# Patient Record
Sex: FEMALE | Race: WHITE | NOT HISPANIC OR LATINO | Employment: FULL TIME | URBAN - METROPOLITAN AREA
[De-identification: names, ages, dates, MRNs, and addresses within clinical notes are randomized per-mention and may not be internally consistent; named-entity substitution may affect disease eponyms.]

---

## 2021-08-12 ENCOUNTER — VBI (OUTPATIENT)
Dept: ADMINISTRATIVE | Facility: OTHER | Age: 51
End: 2021-08-12

## 2021-08-12 NOTE — TELEPHONE ENCOUNTER
Upon review of the In Basket request we were able to locate, review, and update the patient chart as requested for Mammogram and Pap Smear (HPV) aka Cervical Cancer Screening  Any additional questions or concerns should be emailed to the Practice Liaisons via [email or web address] email, please do not reply via In Basket      Thank you  Amber Reyes

## 2021-08-17 ENCOUNTER — OFFICE VISIT (OUTPATIENT)
Dept: OBGYN CLINIC | Facility: CLINIC | Age: 51
End: 2021-08-17
Payer: COMMERCIAL

## 2021-08-17 VITALS
DIASTOLIC BLOOD PRESSURE: 70 MMHG | HEIGHT: 63 IN | BODY MASS INDEX: 22.15 KG/M2 | WEIGHT: 125 LBS | SYSTOLIC BLOOD PRESSURE: 110 MMHG

## 2021-08-17 DIAGNOSIS — M79.18 MYOFASCIAL PAIN SYNDROME: ICD-10-CM

## 2021-08-17 DIAGNOSIS — Z85.41 HISTORY OF CERVICAL CANCER: ICD-10-CM

## 2021-08-17 DIAGNOSIS — R14.0 BLOATING: Primary | ICD-10-CM

## 2021-08-17 DIAGNOSIS — L90.0 LICHEN SCLEROSUS: ICD-10-CM

## 2021-08-17 PROCEDURE — 99213 OFFICE O/P EST LOW 20 MIN: CPT | Performed by: OBSTETRICS & GYNECOLOGY

## 2021-08-17 RX ORDER — FLUTICASONE PROPIONATE 50 MCG
1 SPRAY, SUSPENSION (ML) NASAL DAILY
COMMUNITY

## 2021-08-17 RX ORDER — DIPHENOXYLATE HYDROCHLORIDE AND ATROPINE SULFATE 2.5; .025 MG/1; MG/1
1 TABLET ORAL DAILY
COMMUNITY

## 2021-08-17 RX ORDER — CETIRIZINE HYDROCHLORIDE 10 MG/1
10 TABLET ORAL DAILY
COMMUNITY

## 2021-08-17 NOTE — PROGRESS NOTES
PROBLEM GYNECOLOGICAL VISIT    Eben Cavazos is a 46 y o  female who presents today with complaint of increase in  pelvic pressure   + hx of  Adenocarcinoma of the  Cervix, s/p radical hyst in  1914 ,  Lichen sclerosus et atrophicus  Not sure of that  Dx  No itching  An d myofacial pain syndrome  Pt with recent increase in  Pelvic  Discomfort  Bowels and  Bladder wnl    + worried about ovaries  Not sexually active  Voids every 2-3 hours  +  Increase in pressure at the end of the day  Some dryness     Her general medical history has been reviewed and she reports it as follows:    Past Medical History:   Diagnosis Date    Adenocarcinoma of cervix ([address] )     1A    Lichen sclerosus et atrophicus     Myofascial pain syndrome     Papanicolaou smear 10/25/2019     Past Surgical History:   Procedure Laterality Date    CERVICAL CONE BIOPSY       SECTION   &     HYSTEROSCOPY      [facility]     RADICAL HYSTERECTOMY  2008     OB History        2    Para   2    Term                AB        Living           SAB        TAB        Ectopic        Multiple        Live Births                   Social History     Tobacco Use    Smoking status: Never Smoker    Smokeless tobacco: Never Used   Substance Use Topics    Alcohol use: Yes     Comment: Special occasions (1 or less/month)     Drug use: Never     Comment: No - As per [employer]        Current Outpatient Medications   Medication Instructions    cetirizine (ZYRTEC) 10 mg, Oral, Daily    fluticasone (FLONASE) 50 mcg/act nasal spray 1 spray, Nasal, Daily    Lactobacillus (PROBIOTIC ACIDOPHILUS PO) Oral    multivitamin (THERAGRAN) TABS 1 tablet, Oral, Daily    TURMERIC PO Oral       History of Present Illness:   See CC      Review of Systems:  Review of Systems   Constitutional: Negative  Gastrointestinal: Negative  Genitourinary: Positive for pelvic pain  Negative for decreased urine volume, difficulty urinating, dysuria, enuresis, flank pain, frequency, genital sores, hematuria, urgency, vaginal bleeding, vaginal discharge and vaginal pain  Musculoskeletal: Positive for myalgias  Negative for arthralgias  Skin: Negative  Neurological: Negative  Psychiatric/Behavioral: Negative  All other systems reviewed and are negative  Physical Exam:  /70   Ht 5' 3" (1 6 m)   Wt 56 7 kg (125 lb)   Breastfeeding No   BMI 22 14 kg/m²   Physical Exam  Constitutional:       Appearance: Normal appearance  She is normal weight  Genitourinary:      Pelvic exam was performed with patient in the lithotomy position  Vulva, inguinal canal, urethra, bladder, right adnexa, left adnexa and rectum normal       No posterior fourchette tenderness, injury, rash or lesion present  No lesions in the vagina  Vaginal atrophy and atrophic mucosa present  No vaginal discharge, erythema, tenderness or bleeding  No foreign body in the vagina  Cervix is absent  Uterus is absent  Right adnexa not tender or full  Left adnexa not tender or full  Genitourinary Comments: Noted on  Exam  w valsalva  2 degree  descensus of the anterior  Vaginal wall, cuff intact    Abdominal:      General: Abdomen is flat  Bowel sounds are normal  There is no distension  Tenderness: There is no abdominal tenderness  There is no right CVA tenderness, left CVA tenderness, guarding or rebound  Neurological:      Mental Status: She is alert and oriented to person, place, and time  Skin:     General: Skin is warm  Psychiatric:         Mood and Affect: Mood normal    Vitals and nursing note reviewed  Assessment:   1  Bloating, pelvic  Pressure, hx of cervical  Adenocarcinoma, myofacial pain syndrome    Plan:   + has  currently and working on  Muscles    + dw pt  Core work and  Pelvic  Floor, instructed on  Kasandra,  To get  tv us to assess the ovaries  Due to bloating  Option given for  Pelvic floor  PT   Vs pessary  Fu after US in      Reviewed with patient that test results are available in [place] immediately, but that they will not necessarily be reviewed by me immediately  Explained that I will review results at my earliest opportunity and contact patient appropriately

## 2022-07-13 PROBLEM — R92.30 DENSE BREAST TISSUE ON MAMMOGRAM: Status: ACTIVE | Noted: 2022-07-13

## 2022-07-13 PROBLEM — R92.2 DENSE BREAST TISSUE ON MAMMOGRAM: Status: ACTIVE | Noted: 2022-07-13

## 2022-07-13 PROBLEM — L90.0 LICHEN SCLEROSUS: Status: ACTIVE | Noted: 2022-07-13

## 2022-07-13 PROBLEM — M79.18 MYOFASCIAL PAIN: Status: ACTIVE | Noted: 2022-07-13

## 2022-07-13 NOTE — PROGRESS NOTES
Assessment/Plan:    Encounter for gynecological examination (general) (routine) without abnormal findings  All well, no complaints  Normal breast and pelvic exams  Pap repeated, normal last year  Mammo order given  Colonoscopy , due   Dexa @65, no risk factors       Diagnoses and all orders for this visit:    Encounter for gynecological examination (general) (routine) without abnormal findings    Breast cancer screening by mammogram  -     Mammo screening bilateral w 3d & cad; Future    Personal history of adenocarcinoma of cervix  -     IGP, rfx Aptima HPV ASCU    Screening for malignant neoplasm of the cervix  -     IGP, rfx Aptima HPV ASCU    Other orders  -     Collagen Hydrolysate POWD; Use          Subjective:      Patient ID: Tyler Ayers is a 45 y o  female  Here for well check  The following portions of the patient's history were reviewed and updated as appropriate:   She  has a past medical history of Adenocarcinoma of cervix ([facility] [address] ), Lichen sclerosus et atrophicus, Myofascial pain syndrome, and Papanicolaou smear (10/25/2019)  She   Patient Active Problem List    Diagnosis Date Noted    Encounter for gynecological examination (general) (routine) without abnormal findings 07/15/2022    Dense breast tissue on mammogram     Lichen sclerosus     Myofascial pain 2022    Personal history of adenocarcinoma of cervix     History of radical hysterectomy      She  has a past surgical history that includes  section ( & ); Radical hysterectomy (2008); Cervical cone biopsy (); Hysteroscopy (); Mammo (historical) (Bilateral, 2021); and Colonoscopy ()    Her family history includes Asthma in her mother and paternal grandmother; Corneal ulcer in her father; Heart attack in her mother and paternal grandfather; Hyperlipidemia in her father and mother; Hypertension in her father and mother; Kidney failure in her maternal grandfather; Multiple sclerosis in her maternal grandmother; No Known Problems in her sister, sister, and son; Scoliosis in her sister; Stroke in her brother, father, and mother  She  reports that she has never smoked  She has never used smokeless tobacco  She reports current alcohol use  She reports that she does not use drugs  Current Outpatient Medications   Medication Sig Dispense Refill    cetirizine (ZyrTEC) 10 mg tablet Take 10 mg by mouth daily      Collagen Hydrolysate POWD Use      fluticasone (FLONASE) 50 mcg/act nasal spray 1 spray into each nostril daily      Lactobacillus (PROBIOTIC ACIDOPHILUS PO) Take by mouth      multivitamin (THERAGRAN) TABS Take 1 tablet by mouth daily      TURMERIC PO Take by mouth       No current facility-administered medications for this visit  She is allergic to ceftin [cefuroxime] and penicillins       Review of Systems  No breast, bladder, bowel changes   No new persistent pain, bloating, early satiety or pelvic pressure      Objective:      /70 (BP Location: Left arm, Patient Position: Sitting, Cuff Size: Standard)   Ht 5' 2 75" (1 594 m)   Wt 59 8 kg (131 lb 12 8 oz)   Breastfeeding No   BMI 23 53 kg/m²          Physical Exam    General appearance: no distress, pleasant  Neck: thyroid without nodules or thyromegaly, no palpable adenopathy  Lymph nodes: no palpable adenopathy  Breasts: no masses, nodes or skin changes  Abdomen: soft, non tender, no palpable masses  Pelvic exam: normal atrophic external genitalia, urethral meatus normal, vagina atrophic without lesions, cuff intact, no adnexal masses, non tender  Rectal exam: normal sphincter tone, no masses, RV confirms above

## 2022-07-15 ENCOUNTER — ANNUAL EXAM (OUTPATIENT)
Dept: OBGYN CLINIC | Facility: CLINIC | Age: 52
End: 2022-07-15
Payer: COMMERCIAL

## 2022-07-15 VITALS
SYSTOLIC BLOOD PRESSURE: 128 MMHG | DIASTOLIC BLOOD PRESSURE: 70 MMHG | WEIGHT: 131.8 LBS | HEIGHT: 63 IN | BODY MASS INDEX: 23.35 KG/M2

## 2022-07-15 DIAGNOSIS — Z01.419 ENCOUNTER FOR GYNECOLOGICAL EXAMINATION (GENERAL) (ROUTINE) WITHOUT ABNORMAL FINDINGS: Primary | ICD-10-CM

## 2022-07-15 DIAGNOSIS — Z12.31 BREAST CANCER SCREENING BY MAMMOGRAM: ICD-10-CM

## 2022-07-15 DIAGNOSIS — Z85.41 PERSONAL HISTORY OF MALIGNANT NEOPLASM OF CERVIX UTERI: ICD-10-CM

## 2022-07-15 DIAGNOSIS — Z12.4 SCREENING FOR MALIGNANT NEOPLASM OF THE CERVIX: ICD-10-CM

## 2022-07-15 PROCEDURE — S0612 ANNUAL GYNECOLOGICAL EXAMINA: HCPCS | Performed by: OBSTETRICS & GYNECOLOGY

## 2022-07-15 RX ORDER — COLLAGEN, HYDROLYSATE (BOVINE) 100 %
POWDER (GRAM) MISCELLANEOUS
COMMUNITY

## 2022-07-15 NOTE — ASSESSMENT & PLAN NOTE
All well, no complaints  Normal breast and pelvic exams    Pap repeated, normal last year  Mammo order given  Colonoscopy 2020, due 2030  Dexa @65, no risk factors

## 2022-07-15 NOTE — PATIENT INSTRUCTIONS
Return to office in one year unless having any problems such as breast changes, bleeding, new persistent pain, new progressive bloating, new problems eating (getting full to quickly) or new constant urinary pressure that does not resolve in one week  Continue to strive for total calcium intake of 1200 mg and vitamin D of 1000 IU in combined dietary and supplement forms  You can only absorb 600 mg of calcium at a time  Avoid excess calcium as this may adversely effect the arteries in the heart 
Detail Level: Simple

## 2022-07-15 NOTE — LETTER
July 15, 2022     [address] [place], [place] [address]    Patient: Bina Baron   YOB: 1970   Date of Visit: 7/15/2022       Dear Dr Danyelle Liao: Thank you for referring Bina Baron to me for evaluation  Below are my notes for this consultation  If you have questions, please do not hesitate to call me  I look forward to following your patient along with you  Sincerely,        Lazaro Naranjo MD        CC: No Recipients  Lazaro Naranjo MD  7/15/2022  1:29 PM  Sign when Signing Visit  Assessment/Plan:    Encounter for gynecological examination (general) (routine) without abnormal findings  All well, no complaints  Normal breast and pelvic exams  Pap repeated, normal last year  Mammo order given  Colonoscopy 2020, due 2030  Dexa @65, no risk factors       Diagnoses and all orders for this visit:    Encounter for gynecological examination (general) (routine) without abnormal findings    Breast cancer screening by mammogram  -     Mammo screening bilateral w 3d & cad; Future    Personal history of adenocarcinoma of cervix  -     IGP, rfx Aptima HPV ASCU    Screening for malignant neoplasm of the cervix  -     IGP, rfx Aptima HPV ASCU    Other orders  -     Collagen Hydrolysate POWD; Use          Subjective:      Patient ID: Bina Baron is a 45 y o  female  Here for well check  The following portions of the patient's history were reviewed and updated as appropriate:   She  has a past medical history of Adenocarcinoma of cervix ([address] ), Lichen sclerosus et atrophicus, Myofascial pain syndrome, and Papanicolaou smear (10/25/2019)    She   Patient Active Problem List    Diagnosis Date Noted    Encounter for gynecological examination (general) (routine) without abnormal findings 07/15/2022    Dense breast tissue on mammogram 04/72/0901    Lichen sclerosus 30/32/9197    Myofascial pain 07/13/2022    Personal history of adenocarcinoma of cervix 2008    History of radical hysterectomy      She  has a past surgical history that includes  section ( & ); Radical hysterectomy (2008); Cervical cone biopsy (); Hysteroscopy (); Mammo (historical) (Bilateral, 2021); and Colonoscopy ()  Her family history includes Asthma in her mother and paternal grandmother; Corneal ulcer in her father; Heart attack in her mother and paternal grandfather; Hyperlipidemia in her father and mother; Hypertension in her father and mother; Kidney failure in her maternal grandfather; Multiple sclerosis in her maternal grandmother; No Known Problems in her sister, sister, and son; Scoliosis in her sister; Stroke in her brother, father, and mother  She  reports that she has never smoked  She has never used smokeless tobacco  She reports current alcohol use  She reports that she does not use drugs  Current Outpatient Medications   Medication Sig Dispense Refill    cetirizine (ZyrTEC) 10 mg tablet Take 10 mg by mouth daily      Collagen Hydrolysate POWD Use      fluticasone (FLONASE) 50 mcg/act nasal spray 1 spray into each nostril daily      Lactobacillus (PROBIOTIC ACIDOPHILUS PO) Take by mouth      multivitamin (THERAGRAN) TABS Take 1 tablet by mouth daily      TURMERIC PO Take by mouth       No current facility-administered medications for this visit  She is allergic to ceftin [cefuroxime] and penicillins       Review of Systems  No breast, bladder, bowel changes   No new persistent pain, bloating, early satiety or pelvic pressure      Objective:      /70 (BP Location: Left arm, Patient Position: Sitting, Cuff Size: Standard)   Ht 5' 2 75" (1 594 m)   Wt 59 8 kg (131 lb 12 8 oz)   Breastfeeding No   BMI 23 53 kg/m²          Physical Exam    General appearance: no distress, pleasant  Neck: thyroid without nodules or thyromegaly, no palpable adenopathy  Lymph nodes: no palpable adenopathy  Breasts: no masses, nodes or skin changes  Abdomen: soft, non tender, no palpable masses  Pelvic exam: normal atrophic external genitalia, urethral meatus normal, vagina atrophic without lesions, cuff intact, no adnexal masses, non tender  Rectal exam: normal sphincter tone, no masses, RV confirms above

## 2022-07-20 LAB
CYTOLOGIST CVX/VAG CYTO: NORMAL
DX ICD CODE: NORMAL
OTHER STN SPEC: NORMAL
OTHER STN SPEC: NORMAL
PATH REPORT.FINAL DX SPEC: NORMAL
SL AMB NOTE:: NORMAL
SL AMB SPECIMEN ADEQUACY: NORMAL
SL AMB TEST METHODOLOGY: NORMAL

## 2022-07-21 ENCOUNTER — TELEPHONE (OUTPATIENT)
Dept: OBGYN CLINIC | Facility: CLINIC | Age: 52
End: 2022-07-21

## 2022-07-21 NOTE — TELEPHONE ENCOUNTER
Please encourage to sign up for MyChart and inform of normal pap (h/o cervical cancer in past)    Thanks

## 2022-07-21 NOTE — TELEPHONE ENCOUNTER
Left a message for pt informing pap was normal, if any further questions, please contact the office  Recommended to sign up for My chart to help facilitate receiving results and  messages from provider

## 2023-10-12 DIAGNOSIS — Z12.31 BREAST CANCER SCREENING BY MAMMOGRAM: Primary | ICD-10-CM

## 2023-10-12 NOTE — PROGRESS NOTES
Kiara Patel requesting Mammogram order. Informed Kiara Patel will mail Mammogram order to her home address.

## 2023-12-02 NOTE — PROGRESS NOTES
Assessment/Plan:    Encounter for gynecological examination (general) (routine) without abnormal findings  Here for well check, taking Lexapro now with some benefit to anxiety. Follows with PCP. No breast or gyn complaints. Normal breast and pelvic exams, s/p hysterectomy. Last pap 7/15/22, repeated today due to personal h/o cervical cancer. Mammo and u/s orders given, last 2021 with u/s  Colonoscopy 2020, due 2030  Dexa @65, no risk factors, calcium recs reviewed       Diagnoses and all orders for this visit:    Encounter for gynecological examination (general) (routine) without abnormal findings    History of radical hysterectomy    Personal history of adenocarcinoma of cervix  -     IGP, Aptima HPV, Rfx 16/18,45    Encounter for screening mammogram for malignant neoplasm of breast  -     Mammo screening bilateral w 3d & cad; Future    Screening for malignant neoplasm of the cervix  -     IGP, Aptima HPV, Rfx 16/18,45    Extremely dense tissue of both breasts on mammography  -     US breast screening bilateral complete (ABUS); Future    Other orders  -     escitalopram (LEXAPRO) 5 mg tablet; 5 MG ORALLY DAILY TAKE 1 TAB DAILY AND INCREASE TO 2 TABS DAILY IF NEEDED          Subjective:      Patient ID: Chip Galeana is a 52 y.o. female. HPI Here for well check. The following portions of the patient's history were reviewed and updated as appropriate: She  has a past medical history of Adenocarcinoma of cervix ([address]), Lichen sclerosus et atrophicus, Myofascial pain syndrome, and Papanicolaou smear (10/25/2019).   She   Patient Active Problem List    Diagnosis Date Noted    Encounter for gynecological examination (general) (routine) without abnormal findings 07/15/2022    Dense breast tissue on mammogram 93/97/1628    Lichen sclerosus 20/01/3393    Myofascial pain 07/13/2022    Personal history of adenocarcinoma of cervix 2008    History of radical hysterectomy 2008     She  has a past surgical history that includes  section ( & ); Radical hysterectomy (2008); Cervical cone biopsy (); Hysteroscopy (); Mammo (historical) (Bilateral, 2021); and Colonoscopy (). Her family history includes Asthma in her mother and paternal grandmother; Corneal ulcer in her father; Heart attack in her mother and paternal grandfather; Hyperlipidemia in her father and mother; Hypertension in her father and mother; Kidney failure in her maternal grandfather; Multiple sclerosis in her maternal grandmother; No Known Problems in her sister, sister, and son; Scoliosis in her sister; Stroke in her brother, father, and mother. She  reports that she has never smoked. She has never used smokeless tobacco. She reports current alcohol use. She reports that she does not use drugs. Current Outpatient Medications   Medication Sig Dispense Refill    cetirizine (ZyrTEC) 10 mg tablet Take 10 mg by mouth daily      Collagen Hydrolysate POWD Use      escitalopram (LEXAPRO) 5 mg tablet 5 MG ORALLY DAILY TAKE 1 TAB DAILY AND INCREASE TO 2 TABS DAILY IF NEEDED      fluticasone (FLONASE) 50 mcg/act nasal spray 1 spray into each nostril daily      Lactobacillus (PROBIOTIC ACIDOPHILUS PO) Take by mouth      multivitamin (THERAGRAN) TABS Take 1 tablet by mouth daily      TURMERIC PO Take by mouth       No current facility-administered medications for this visit. She is allergic to ceftin [cefuroxime] and penicillins. .    Review of Systems  No PMB, breast, bladder, bowel changes.  No new persistent pain, bloating, early satiety or pelvic pressure      Objective:      /68 (BP Location: Left arm, Patient Position: Sitting, Cuff Size: Standard)   Ht 5' 4" (1.626 m)   Wt 58.5 kg (129 lb)   Breastfeeding No   BMI 22.14 kg/m²          Physical Exam    General appearance: no distress, pleasant  Neck: thyroid without nodules or thyromegaly, no palpable adenopathy  Lymph nodes: no palpable adenopathy  Breasts: no masses, nodes or skin changes  Abdomen: soft, non tender, no palpable masses  Pelvic exam: normal atrophic external genitalia, urethral meatus normal, vagina atrophic without lesions, cuff intact, no adnexal masses, non tender  Rectal exam: normal sphincter tone, no masses, RV confirms above

## 2023-12-04 ENCOUNTER — ANNUAL EXAM (OUTPATIENT)
Dept: OBGYN CLINIC | Facility: CLINIC | Age: 53
End: 2023-12-04
Payer: COMMERCIAL

## 2023-12-04 VITALS
DIASTOLIC BLOOD PRESSURE: 68 MMHG | HEIGHT: 64 IN | SYSTOLIC BLOOD PRESSURE: 116 MMHG | WEIGHT: 129 LBS | BODY MASS INDEX: 22.02 KG/M2

## 2023-12-04 DIAGNOSIS — Z12.4 SCREENING FOR MALIGNANT NEOPLASM OF THE CERVIX: ICD-10-CM

## 2023-12-04 DIAGNOSIS — Z85.41 PERSONAL HISTORY OF MALIGNANT NEOPLASM OF CERVIX UTERI: ICD-10-CM

## 2023-12-04 DIAGNOSIS — Z01.419 ENCOUNTER FOR GYNECOLOGICAL EXAMINATION (GENERAL) (ROUTINE) WITHOUT ABNORMAL FINDINGS: Primary | ICD-10-CM

## 2023-12-04 DIAGNOSIS — Z90.710 HISTORY OF HYSTERECTOMY: ICD-10-CM

## 2023-12-04 DIAGNOSIS — Z12.31 ENCOUNTER FOR SCREENING MAMMOGRAM FOR MALIGNANT NEOPLASM OF BREAST: ICD-10-CM

## 2023-12-04 DIAGNOSIS — R92.343 EXTREMELY DENSE TISSUE OF BOTH BREASTS ON MAMMOGRAPHY: ICD-10-CM

## 2023-12-04 PROCEDURE — S0612 ANNUAL GYNECOLOGICAL EXAMINA: HCPCS | Performed by: OBSTETRICS & GYNECOLOGY

## 2023-12-04 RX ORDER — ESCITALOPRAM OXALATE 5 MG/1
TABLET ORAL
COMMUNITY
Start: 2023-10-12

## 2023-12-04 NOTE — ASSESSMENT & PLAN NOTE
Here for well check, taking Lexapro now with some benefit to anxiety. Follows with PCP. No breast or gyn complaints. Normal breast and pelvic exams, s/p hysterectomy. Last pap 7/15/22, repeated today due to personal h/o cervical cancer.   Mammo and u/s orders given, last 2021 with u/s  Colonoscopy 2020, due 2030  Dexa @65, no risk factors, calcium recs reviewed

## 2023-12-04 NOTE — LETTER
December 4, 2023     DO Taye Salazar  [address]    Patient: Corwin Jung   YOB: 1970   Date of Visit: 12/4/2023       Dear Dr. Carolyn Millerr:    Corwin Jung was in today for her annual gyn exam. Below are my notes for this consultation. If you have questions, please do not hesitate to call me. I look forward to following your patient along with you. Sincerely,        Geena Avalos MD        CC: No Recipients    Geena Avalos MD  12/4/2023  2:22 PM  Sign when Signing Visit  Assessment/Plan:    Encounter for gynecological examination (general) (routine) without abnormal findings  Here for well check, taking Lexapro now with some benefit to anxiety. Follows with PCP. No breast or gyn complaints. Normal breast and pelvic exams, s/p hysterectomy. Last pap 7/15/22, repeated today due to personal h/o cervical cancer. Mammo and u/s orders given, last 2021 with u/s  Colonoscopy 2020, due 2030  Dexa @65, no risk factors, calcium recs reviewed       Diagnoses and all orders for this visit:    Encounter for gynecological examination (general) (routine) without abnormal findings    History of radical hysterectomy    Personal history of adenocarcinoma of cervix  -     IGP, Aptima HPV, Rfx 16/18,45    Encounter for screening mammogram for malignant neoplasm of breast  -     Mammo screening bilateral w 3d & cad; Future    Screening for malignant neoplasm of the cervix  -     IGP, Aptima HPV, Rfx 16/18,45    Extremely dense tissue of both breasts on mammography  -     US breast screening bilateral complete (ABUS); Future    Other orders  -     escitalopram (LEXAPRO) 5 mg tablet; 5 MG ORALLY DAILY TAKE 1 TAB DAILY AND INCREASE TO 2 TABS DAILY IF NEEDED          Subjective:      Patient ID: Corwin Jung is a 52 y.o. female. HPI Here for well check.     The following portions of the patient's history were reviewed and updated as appropriate: She  has a past medical history of Adenocarcinoma of cervix ([address]), Lichen sclerosus et atrophicus, Myofascial pain syndrome, and Papanicolaou smear (10/25/2019). She   Patient Active Problem List    Diagnosis Date Noted   • Encounter for gynecological examination (general) (routine) without abnormal findings 07/15/2022   • Dense breast tissue on mammogram    • Lichen sclerosus    • Myofascial pain 2022   • Personal history of adenocarcinoma of cervix    • History of radical hysterectomy      She  has a past surgical history that includes  section ( & ); Radical hysterectomy (2008); Cervical cone biopsy (); Hysteroscopy (); Mammo (historical) (Bilateral, 2021); and Colonoscopy (). Her family history includes Asthma in her mother and paternal grandmother; Corneal ulcer in her father; Heart attack in her mother and paternal grandfather; Hyperlipidemia in her father and mother; Hypertension in her father and mother; Kidney failure in her maternal grandfather; Multiple sclerosis in her maternal grandmother; No Known Problems in her sister, sister, and son; Scoliosis in her sister; Stroke in her brother, father, and mother. She  reports that she has never smoked. She has never used smokeless tobacco. She reports current alcohol use. She reports that she does not use drugs. Current Outpatient Medications   Medication Sig Dispense Refill   • cetirizine (ZyrTEC) 10 mg tablet Take 10 mg by mouth daily     • Collagen Hydrolysate POWD Use     • escitalopram (LEXAPRO) 5 mg tablet 5 MG ORALLY DAILY TAKE 1 TAB DAILY AND INCREASE TO 2 TABS DAILY IF NEEDED     • fluticasone (FLONASE) 50 mcg/act nasal spray 1 spray into each nostril daily     • Lactobacillus (PROBIOTIC ACIDOPHILUS PO) Take by mouth     • multivitamin (THERAGRAN) TABS Take 1 tablet by mouth daily     • TURMERIC PO Take by mouth       No current facility-administered medications for this visit.      She is allergic to ceftin [cefuroxime] and penicillins. .    Review of Systems  No PMB, breast, bladder, bowel changes.  No new persistent pain, bloating, early satiety or pelvic pressure      Objective:      /68 (BP Location: Left arm, Patient Position: Sitting, Cuff Size: Standard)   Ht 5' 4" (1.626 m)   Wt 58.5 kg (129 lb)   Breastfeeding No   BMI 22.14 kg/m²          Physical Exam    General appearance: no distress, pleasant  Neck: thyroid without nodules or thyromegaly, no palpable adenopathy  Lymph nodes: no palpable adenopathy  Breasts: no masses, nodes or skin changes  Abdomen: soft, non tender, no palpable masses  Pelvic exam: normal atrophic external genitalia, urethral meatus normal, vagina atrophic without lesions, cuff intact, no adnexal masses, non tender  Rectal exam: normal sphincter tone, no masses, RV confirms above

## 2023-12-08 LAB
CYTOLOGIST CVX/VAG CYTO: NORMAL
DX ICD CODE: NORMAL
HPV GENOTYPE REFLEX: NORMAL
HPV I/H RISK 4 DNA CVX QL PROBE+SIG AMP: NEGATIVE
OTHER STN SPEC: NORMAL
PATH REPORT.FINAL DX SPEC: NORMAL
SL AMB NOTE:: NORMAL
SL AMB SPECIMEN ADEQUACY: NORMAL
SL AMB TEST METHODOLOGY: NORMAL

## 2024-02-21 PROBLEM — Z01.419 ENCOUNTER FOR GYNECOLOGICAL EXAMINATION (GENERAL) (ROUTINE) WITHOUT ABNORMAL FINDINGS: Status: RESOLVED | Noted: 2022-07-15 | Resolved: 2024-02-21

## 2024-12-27 ENCOUNTER — TELEPHONE (OUTPATIENT)
Age: 54
End: 2024-12-27

## 2024-12-27 DIAGNOSIS — R92.343 EXTREMELY DENSE TISSUE OF BOTH BREASTS ON MAMMOGRAPHY: Primary | ICD-10-CM

## 2024-12-27 DIAGNOSIS — Z12.31 BREAST CANCER SCREENING BY MAMMOGRAM: ICD-10-CM

## 2024-12-27 NOTE — TELEPHONE ENCOUNTER
Patient called in regarding new order for mammo and breast US. The ones on file are . Ok to message in [place] once available. Thank you

## 2024-12-27 NOTE — TELEPHONE ENCOUNTER
"12/27/24 pt is able to see orders through pt's portal \"my chart\", I also mail to pt's address on file.   "

## 2025-01-29 NOTE — ASSESSMENT & PLAN NOTE
Here for well check, no breast or gyn concerns.   Still with sig family stress, lives with 90 y.o. mother.     Normal breast and pelvic exams.  Last pap 12/2023 neg/HPV neg; repeated due to personal h/o cervical cancer  Mammo and screening u/s orders given, last 2021, [place] reviewed. Agrees to schedule.  Colonoscopy 2020, due 2030  Dexa @63, no risk factors. Calcium recs given.

## 2025-01-29 NOTE — PROGRESS NOTES
Name: Erin Melara      : 1970      MRN: [id number]  Encounter Provider: Shannon Arredondo MD  Encounter Date: 2025   Encounter department: [facility] [place]  :  Assessment & Plan  Encounter for gynecological examination (general) (routine) without abnormal findings  Here for well check, no breast or gyn concerns.   Still with sig family stress, lives with 90 y.o. mother.     Normal breast and pelvic exams.  Last pap 2023 neg/HPV neg; repeated due to personal h/o cervical cancer  Mammo and screening u/s orders given, last , [employer] reviewed. Agrees to schedule.  Colonoscopy , due   Dexa @65, no risk factors. Calcium recs given.       Breast cancer screening by mammogram    Orders:    Mammo screening bilateral w 3d and cad; Future    Screening for malignant neoplasm of the cervix    Orders:    IGP, Aptima HPV, Rfx 16/18,45    Personal history of adenocarcinoma of cervix    Orders:    IGP, Aptima HPV, Rfx 16/18,45    Extremely dense tissue of both breasts on mammography    Orders:    US breast screening bilateral complete (ABUS); Future    History of Present Illness   HPI  Erin Melara is a 54 y.o. female who presents for well check. Still with sig stress cohabitating with mother, other family stress.    Review of Systems No PMB, breast, bladder, bowel changes. No new persistent pain, bloating, early satiety or pelvic pressure  +weight gain    Past Medical History   Past Medical History:   Diagnosis Date    Lichen sclerosus et atrophicus     Myofascial pain syndrome     Personal h/o adenocarcinoma of cervix (HCC)     1A     Past Surgical History:   Procedure Laterality Date    CERVICAL CONE BIOPSY       SECTION   &     COLONOSCOPY  2020    Due     HYSTEROSCOPY  2008    Radical    RADICAL HYSTERECTOMY  2008     Family History   Problem Relation Age of Onset    Stroke Mother     Hyperlipidemia Mother     Hypertension Mother     Heart attack  "Mother     Asthma Mother     Hyperlipidemia Father     Hypertension Father     Stroke Father     Corneal ulcer Father     No Known Problems Sister     Scoliosis Sister     No Known Problems Sister     Stroke Brother     No Known Problems Son     Multiple sclerosis Maternal Grandmother     Kidney failure Maternal Grandfather     Asthma Paternal Grandmother     Heart attack Paternal Grandfather     Breast cancer Neg Hx     Uterine cancer Neg Hx     Ovarian cancer Neg Hx     Colon cancer Neg Hx       reports that she has never smoked. She has never been exposed to tobacco smoke. She has never used smokeless tobacco. She reports current alcohol use. She reports that she does not use drugs.  Current Outpatient Medications on File Prior to Visit   Medication Sig Dispense Refill    cetirizine (ZyrTEC) 10 mg tablet Take 10 mg by mouth daily      Collagen Hydrolysate POWD Use      escitalopram (LEXAPRO) 5 mg tablet 5 MG ORALLY DAILY TAKE 1 TAB DAILY AND INCREASE TO 2 TABS DAILY IF NEEDED      fluticasone (FLONASE) 50 mcg/act nasal spray 1 spray into each nostril daily      Lactobacillus (PROBIOTIC ACIDOPHILUS PO) Take by mouth      multivitamin (THERAGRAN) TABS Take 1 tablet by mouth daily      TURMERIC PO Take by mouth (Patient not taking: Reported on 1/31/2025)       No current facility-administered medications on file prior to visit.     Allergies   Allergen Reactions    Ceftin [Cefuroxime] Rash    Penicillins Rash         Objective   /86 (BP Location: Right arm, Patient Position: Sitting, Cuff Size: Standard)   Ht 5' 4\" (1.626 m)   Wt 66.7 kg (147 lb)   BMI 25.23 kg/m²      Physical Exam  General appearance: no distress, pleasant  Neck: thyroid without nodules or thyromegaly, no palpable adenopathy  Lymph nodes: no palpable adenopathy  Breasts: no masses, nodes or skin changes  Abdomen: soft, non tender, no palpable masses  Pelvic exam: normal atrophic external genitalia, urethral meatus normal, vagina atrophic " without lesions, very minimal rectocele, cuff intact, no adnexal masses, non tender  Rectal exam: normal sphincter tone, no masses, RV confirms above

## 2025-01-31 ENCOUNTER — ANNUAL EXAM (OUTPATIENT)
Dept: OBGYN CLINIC | Facility: CLINIC | Age: 55
End: 2025-01-31
Payer: COMMERCIAL

## 2025-01-31 VITALS
WEIGHT: 147 LBS | BODY MASS INDEX: 25.1 KG/M2 | HEIGHT: 64 IN | DIASTOLIC BLOOD PRESSURE: 86 MMHG | SYSTOLIC BLOOD PRESSURE: 132 MMHG

## 2025-01-31 DIAGNOSIS — Z85.41 PERSONAL HISTORY OF MALIGNANT NEOPLASM OF CERVIX UTERI: ICD-10-CM

## 2025-01-31 DIAGNOSIS — Z01.419 ENCOUNTER FOR GYNECOLOGICAL EXAMINATION (GENERAL) (ROUTINE) WITHOUT ABNORMAL FINDINGS: Primary | ICD-10-CM

## 2025-01-31 DIAGNOSIS — R92.343 EXTREMELY DENSE TISSUE OF BOTH BREASTS ON MAMMOGRAPHY: ICD-10-CM

## 2025-01-31 DIAGNOSIS — Z12.4 SCREENING FOR MALIGNANT NEOPLASM OF THE CERVIX: ICD-10-CM

## 2025-01-31 DIAGNOSIS — Z12.31 BREAST CANCER SCREENING BY MAMMOGRAM: ICD-10-CM

## 2025-01-31 PROCEDURE — S0612 ANNUAL GYNECOLOGICAL EXAMINA: HCPCS | Performed by: OBSTETRICS & GYNECOLOGY

## 2025-01-31 NOTE — LETTER
2025     Milana Tan, DO  [address]  [address]  [facility] [address]    Patient: Erin Melara   YOB: 1970   Date of Visit: 2025       Dear Dr. Tan:    Erin Melara was in today for her annual gyn exam. Below are my notes for this visit.    If you have questions, please do not hesitate to call me. I look forward to following your patient along with you.         Sincerely,        Shannon Arredondo MD        CC: No Recipients    Shannon Arredondo MD  2025  3:23 PM  Sign when Signing Visit  Name: Erin Melara      : 1970      MRN: [id number]  Encounter Provider: Shannon Arredondo MD  Encounter Date: 2025   Encounter department: [facility] [place]  :  Assessment & Plan  Encounter for gynecological examination (general) (routine) without abnormal findings  Here for well check, no breast or gyn concerns.   Still with sig family stress, lives with 90 y.o. mother.     Normal breast and pelvic exams.  Last pap 2023 neg/HPV neg; repeated due to personal h/o cervical cancer  Mammo and screening u/s orders given, last , [employer] reviewed. Agrees to schedule.  Colonoscopy , due   Dexa @63, no risk factors. Calcium recs given.       Breast cancer screening by mammogram    Orders:  •  Mammo screening bilateral w 3d and cad; Future    Screening for malignant neoplasm of the cervix    Orders:  •  IGP, Aptima HPV, Rfx 16/18,45    Personal history of adenocarcinoma of cervix    Orders:  •  IGP, Aptima HPV, Rfx 16/18,45    Extremely dense tissue of both breasts on mammography    Orders:  •  US breast screening bilateral complete (ABUS); Future    History of Present Illness  HPI  Erin Melara is a 54 y.o. female who presents for well check. Still with sig stress cohabitating with mother, other family stress.    Review of Systems No PMB, breast, bladder, bowel changes. No new persistent pain, bloating, early satiety or pelvic pressure  +weight gain    Past  Medical History  Past Medical History:   Diagnosis Date   • Lichen sclerosus et atrophicus    • Myofascial pain syndrome    • Personal h/o adenocarcinoma of cervix (HCC) 2008    1A     Past Surgical History:   Procedure Laterality Date   • CERVICAL CONE BIOPSY     •  SECTION   &    • COLONOSCOPY      Due    • HYSTEROSCOPY      Radical   • RADICAL HYSTERECTOMY  2008     Family History   Problem Relation Age of Onset   • Stroke Mother    • Hyperlipidemia Mother    • Hypertension Mother    • Heart attack Mother    • Asthma Mother    • Hyperlipidemia Father    • Hypertension Father    • Stroke Father    • Corneal ulcer Father    • No Known Problems Sister    • Scoliosis Sister    • No Known Problems Sister    • Stroke Brother    • No Known Problems Son    • Multiple sclerosis Maternal Grandmother    • Kidney failure Maternal Grandfather    • Asthma Paternal Grandmother    • Heart attack Paternal Grandfather    • Breast cancer Neg Hx    • Uterine cancer Neg Hx    • Ovarian cancer Neg Hx    • Colon cancer Neg Hx       reports that she has never smoked. She has never been exposed to tobacco smoke. She has never used smokeless tobacco. She reports current alcohol use. She reports that she does not use drugs.  Current Outpatient Medications on File Prior to Visit   Medication Sig Dispense Refill   • cetirizine (ZyrTEC) 10 mg tablet Take 10 mg by mouth daily     • Collagen Hydrolysate POWD Use     • escitalopram (LEXAPRO) 5 mg tablet 5 MG ORALLY DAILY TAKE 1 TAB DAILY AND INCREASE TO 2 TABS DAILY IF NEEDED     • fluticasone (FLONASE) 50 mcg/act nasal spray 1 spray into each nostril daily     • Lactobacillus (PROBIOTIC ACIDOPHILUS PO) Take by mouth     • multivitamin (THERAGRAN) TABS Take 1 tablet by mouth daily     • TURMERIC PO Take by mouth (Patient not taking: Reported on 2025)       No current facility-administered medications on file prior to visit.     Allergies   Allergen  "Reactions   • Ceftin [Cefuroxime] Rash   • Penicillins Rash         Objective  /86 (BP Location: Right arm, Patient Position: Sitting, Cuff Size: Standard)   Ht 5' 4\" (1.626 m)   Wt 66.7 kg (147 lb)   BMI 25.23 kg/m²      Physical Exam  General appearance: no distress, pleasant  Neck: thyroid without nodules or thyromegaly, no palpable adenopathy  Lymph nodes: no palpable adenopathy  Breasts: no masses, nodes or skin changes  Abdomen: soft, non tender, no palpable masses  Pelvic exam: normal atrophic external genitalia, urethral meatus normal, vagina atrophic without lesions, very minimal rectocele, cuff intact, no adnexal masses, non tender  Rectal exam: normal sphincter tone, no masses, RV confirms above        "

## 2025-01-31 NOTE — PATIENT INSTRUCTIONS
Get your mammogram done!!!!    Return to office in one year unless having any problems such as breast changes, bleeding, new persistent pain, new progressive bloating, new problems eating (getting full to quickly) or new constant urinary pressure that does not resolve in one week.    Continue to strive for 1200 mg of calcium and 1000 IU of vitamin D daily in diet and supplements combined for your bone health.  You can only absorb 600 mg of calcium at a time. Avoid excess calcium as this may adversely effect your heart.  There are 400 mg of calcium in an 8 oz serving of dairy.  [place] milk has 600 mg of calcium in an 8 oz serving.

## 2025-02-06 ENCOUNTER — RESULTS FOLLOW-UP (OUTPATIENT)
Dept: OBGYN CLINIC | Facility: CLINIC | Age: 55
End: 2025-02-06
